# Patient Record
Sex: FEMALE | URBAN - METROPOLITAN AREA
[De-identification: names, ages, dates, MRNs, and addresses within clinical notes are randomized per-mention and may not be internally consistent; named-entity substitution may affect disease eponyms.]

---

## 2019-06-11 ENCOUNTER — IMPORTED ENCOUNTER (OUTPATIENT)
Dept: URBAN - METROPOLITAN AREA CLINIC 1 | Facility: CLINIC | Age: 59
End: 2019-06-11

## 2019-06-11 PROBLEM — H25.13: Noted: 2019-06-11

## 2019-06-11 PROBLEM — H00.14: Noted: 2019-06-11

## 2019-06-11 PROBLEM — H00.15: Noted: 2019-06-11

## 2019-06-11 PROCEDURE — 92002 INTRM OPH EXAM NEW PATIENT: CPT

## 2019-06-11 NOTE — PATIENT DISCUSSION
1.  Chalazion COMPA x2 with secondary cellulitis COMPA- Begin Hot Compresses TID x5 minutes Begin Baby Shampoo Lid Scrubs. Begin Keflex 500 mg po BID x10 days (Disp #20) Rx sent to patient's pharmacy. D/c Nelson Polydex. D/c po ASA 81mg starting today per PMG. 2. Small Chalazion LLL- Begin Hot Compresses TID x5 minutes Begin Baby Shampoo Lid Scrubs. Begin Keflex 500 mg po BID x10 days (Disp #20) Rx sent to patient's pharmacy. D/c Nelson Polydex. 3  Cataract OU: Observe for now without intervention. The patient was advised to contact us if any change or worsening of vision4. H/o ARMD OU - re-evaluate once patient is stable s/p chalazion/cellulitis. Return for an appointment in Wed 10/ Possible I&D COMPA with Dr. Christy Jarrell.

## 2019-06-11 NOTE — PATIENT DISCUSSION
Chalazion LLL--Patient was compliant with hot compresses without resolution. Discussed option for Incision and Drainage. Risks and Benefits discussed with patient. Patient stated complete understanding and would like to proceed with procedure.

## 2019-06-19 ENCOUNTER — IMPORTED ENCOUNTER (OUTPATIENT)
Dept: URBAN - METROPOLITAN AREA CLINIC 1 | Facility: CLINIC | Age: 59
End: 2019-06-19

## 2019-06-19 PROBLEM — H00.12: Noted: 2019-06-19

## 2019-06-19 PROBLEM — H00.14: Noted: 2019-06-19

## 2019-06-19 PROCEDURE — 67800 REMOVE EYELID LESION: CPT

## 2019-06-19 NOTE — PATIENT DISCUSSION
Incision and drainage of chalazion on left upper eyelid: After proper informed consent was obtained the patient was taken to the operating room and placed supine on the operating table. The left upper eye lid was prepped in the standard surgical fashion. Xylocaine 1% and Epinephrine was infiltrated around the chalazion. A chalazion speculum was then placed and the eyelid was everted. A cross incision was made through the chalazion and immediate release of the lipogranulomatous material was expressed. A curette was used to further evacuate the chalazion cavity. The speculum was removed ointment was applied and a pressure patch was then placed. The patient tolerated the procedure well and there were no complications. The patient was instructed to use Maxitrol ointment qhs OS.

## 2019-06-19 NOTE — PATIENT DISCUSSION
1.  Chalazion COMPA medial - improved. Chalazion COMPA laterally with no improvement - Patient was compliant with hot compresses without resolution of lateral chalazion. Discussed option for Incision and Drainage. Risks and Benefits discussed with patient. Patient stated complete understanding and would like to proceed with procedure today in office. 2. Chalazion right lower eyelid - much improved. Incision and drainage of chalazion on left upper eyelid: After proper informed consent was obtained the patient was taken to the operating room and placed supine on the operating table. The left upper eye lid was prepped in the standard surgical fashion. Xylocaine 1% and Epinephrine was infiltrated around the chalazion. A chalazion speculum was then placed and a cross incision was made through the chalazion externally and immediate release of the lipogranulomatous material was expressed. A curette was used to further evacuate the chalazion cavity. The speculum was removed ointment was applied and a pressure patch was then placed. The patient tolerated the procedure well and there were no complications. Use Neosporin sang QHS COMPA x 5 days. RTC: PRN.

## 2022-04-08 ASSESSMENT — TONOMETRY
OD_IOP_MMHG: 18
OS_IOP_MMHG: 20

## 2022-04-08 ASSESSMENT — VISUAL ACUITY
OD_SC: 20/20-2
OS_SC: 20/20-2
OS_SC: 20/20-2
OD_SC: 20/20-2